# Patient Record
Sex: FEMALE | Race: WHITE | NOT HISPANIC OR LATINO | URBAN - METROPOLITAN AREA
[De-identification: names, ages, dates, MRNs, and addresses within clinical notes are randomized per-mention and may not be internally consistent; named-entity substitution may affect disease eponyms.]

---

## 2021-04-20 ENCOUNTER — OFFICE VISIT (OUTPATIENT)
Dept: URBAN - METROPOLITAN AREA CLINIC 80 | Facility: CLINIC | Age: 51
End: 2021-04-20
Payer: COMMERCIAL

## 2021-04-20 DIAGNOSIS — Z83.71 FAMILY HISTORY OF COLONIC POLYPS: ICD-10-CM

## 2021-04-20 DIAGNOSIS — R19.4 CHANGE IN BOWEL HABIT: ICD-10-CM

## 2021-04-20 DIAGNOSIS — R68.89 THROAT CLEARING: ICD-10-CM

## 2021-04-20 DIAGNOSIS — K21.9 LPRD (LARYNGOPHARYNGEAL REFLUX DISEASE): ICD-10-CM

## 2021-04-20 PROCEDURE — 99245 OFF/OP CONSLTJ NEW/EST HI 55: CPT | Performed by: INTERNAL MEDICINE

## 2021-04-20 PROCEDURE — 99244 OFF/OP CNSLTJ NEW/EST MOD 40: CPT | Performed by: INTERNAL MEDICINE

## 2021-04-20 RX ORDER — FAMOTIDINE 10 MG/1
1 TABLET AS NEEDED TABLET, FILM COATED ORAL TWICE A DAY
Status: ACTIVE | COMMUNITY

## 2021-04-20 RX ORDER — POLYETHYLENE GLYCOL 3350, SODIUM SULFATE, SODIUM CHLORIDE, POTASSIUM CHLORIDE, ASCORBIC ACID, SODIUM ASCORBATE 140-9-5.2G
AS DIRECTED KIT ORAL 1
Qty: 1 | Refills: 0 | OUTPATIENT
Start: 2021-04-20 | End: 2021-04-21

## 2021-04-20 RX ORDER — FAMOTIDINE 40 MG/1
1 TABLET AT BEDTIME TABLET, FILM COATED ORAL BID
Qty: 180 TABLET | Refills: 3 | OUTPATIENT
Start: 2021-04-20

## 2021-04-20 RX ORDER — ISOTRETINOIN 40 MG/1
AS DIRECTED CAPSULE, GELATIN COATED ORAL
Status: ACTIVE | COMMUNITY

## 2021-04-20 RX ORDER — MULTIVITAMIN
1 TABLET TABLET ORAL ONCE A DAY
Status: ACTIVE | COMMUNITY

## 2021-04-20 RX ORDER — ACETAMINOPHEN 500 MG
AS DIRECTED TABLET ORAL
Status: ACTIVE | COMMUNITY

## 2021-04-20 RX ORDER — DROSPIRENONE AND ETHINYL ESTRADIOL 0.03MG-3MG
TAKE 1 TABLET BY ORAL ROUTE ONCE DAILY KIT ORAL 1
Qty: 0 | Refills: 0 | Status: DISCONTINUED | COMMUNITY
Start: 1900-01-01

## 2021-04-20 NOTE — HPI-TODAY'S VISIT:
The patient was referred by Dr. Milly Pizarro for LPR .   A copy of this document is being forwarded to the referring provider. In past Zantac helped a ton - Has tried Pepcid - is better than it was but not fully - in past could be all day - now mostly when she eats  Takes Pepcid 20mg before bed time Nausea at times, no emesis No abd pain No fevers or chills At times coughing occurs   Has never felt heartburn or indigestion Has never had egd or colon Normal bowel habit was 1 BM every am - started to change more 9 months ago - now can go a few days between bm - gets uncomfortable - takes a probiotic nightly - no BRBPR or melena Father had colon polyps She has tried some OTC 14 day PPIS, helped for a few days and then would stop NO NSAID use

## 2021-05-19 ENCOUNTER — CLAIMS CREATED FROM THE CLAIM WINDOW (OUTPATIENT)
Dept: URBAN - METROPOLITAN AREA CLINIC 4 | Facility: CLINIC | Age: 51
End: 2021-05-19
Payer: COMMERCIAL

## 2021-05-19 ENCOUNTER — OFFICE VISIT (OUTPATIENT)
Dept: URBAN - METROPOLITAN AREA SURGERY CENTER 19 | Facility: SURGERY CENTER | Age: 51
End: 2021-05-19
Payer: COMMERCIAL

## 2021-05-19 DIAGNOSIS — Z86.010 H/O ADENOMATOUS POLYP OF COLON: ICD-10-CM

## 2021-05-19 DIAGNOSIS — K21.9 GASTRO-ESOPHAGEAL REFLUX DISEASE WITHOUT ESOPHAGITIS: ICD-10-CM

## 2021-05-19 DIAGNOSIS — K31.89 GASTRIC PERFORATION WITHOUT ULCER: ICD-10-CM

## 2021-05-19 DIAGNOSIS — K31.89 ACQUIRED DEFORMITY OF DUODENUM: ICD-10-CM

## 2021-05-19 DIAGNOSIS — K21.9 ACID REFLUX: ICD-10-CM

## 2021-05-19 DIAGNOSIS — R09.89 ABDOMINAL BRUIT: ICD-10-CM

## 2021-05-19 PROCEDURE — 88305 TISSUE EXAM BY PATHOLOGIST: CPT | Performed by: PATHOLOGY

## 2021-05-19 PROCEDURE — G0105 COLORECTAL SCRN; HI RISK IND: HCPCS | Performed by: INTERNAL MEDICINE

## 2021-05-19 PROCEDURE — 43239 EGD BIOPSY SINGLE/MULTIPLE: CPT | Performed by: INTERNAL MEDICINE

## 2021-05-19 PROCEDURE — 88312 SPECIAL STAINS GROUP 1: CPT | Performed by: PATHOLOGY

## 2021-05-19 PROCEDURE — G8907 PT DOC NO EVENTS ON DISCHARG: HCPCS | Performed by: INTERNAL MEDICINE

## 2021-05-19 RX ORDER — FAMOTIDINE 10 MG/1
1 TABLET AS NEEDED TABLET, FILM COATED ORAL TWICE A DAY
Status: ACTIVE | COMMUNITY

## 2021-05-19 RX ORDER — MULTIVITAMIN
1 TABLET TABLET ORAL ONCE A DAY
Status: ACTIVE | COMMUNITY

## 2021-05-19 RX ORDER — ACETAMINOPHEN 500 MG
AS DIRECTED TABLET ORAL
Status: ACTIVE | COMMUNITY

## 2021-05-19 RX ORDER — ISOTRETINOIN 40 MG/1
AS DIRECTED CAPSULE, GELATIN COATED ORAL
Status: ACTIVE | COMMUNITY

## 2021-05-19 RX ORDER — FAMOTIDINE 40 MG/1
1 TABLET AT BEDTIME TABLET, FILM COATED ORAL BID
Qty: 180 TABLET | Refills: 3 | Status: ACTIVE | COMMUNITY
Start: 2021-04-20

## 2021-05-25 ENCOUNTER — TELEPHONE ENCOUNTER (OUTPATIENT)
Dept: URBAN - METROPOLITAN AREA CLINIC 79 | Facility: CLINIC | Age: 51
End: 2021-05-25

## 2021-05-25 RX ORDER — FAMOTIDINE 40 MG/1
1 TABLET AT BEDTIME TABLET, FILM COATED ORAL BID
Qty: 180 TABLET | Refills: 3
Start: 2021-04-20

## 2021-05-27 ENCOUNTER — TELEPHONE ENCOUNTER (OUTPATIENT)
Dept: URBAN - METROPOLITAN AREA CLINIC 79 | Facility: CLINIC | Age: 51
End: 2021-05-27

## 2021-05-27 RX ORDER — PANTOPRAZOLE SODIUM 40 MG/1
1 TABLET TABLET, DELAYED RELEASE ORAL ONCE A DAY
Qty: 90 TABLET | Refills: 3 | OUTPATIENT
Start: 2021-05-27

## 2021-06-07 ENCOUNTER — WEB ENCOUNTER (OUTPATIENT)
Dept: URBAN - METROPOLITAN AREA CLINIC 80 | Facility: CLINIC | Age: 51
End: 2021-06-07

## 2021-06-14 ENCOUNTER — TELEPHONE ENCOUNTER (OUTPATIENT)
Dept: URBAN - METROPOLITAN AREA CLINIC 79 | Facility: CLINIC | Age: 51
End: 2021-06-14

## 2021-06-14 RX ORDER — FAMOTIDINE 40 MG/1
1 TABLET AT BEDTIME TABLET, FILM COATED ORAL BID
Qty: 180 TABLET | Refills: 3
Start: 2021-04-20

## 2021-07-07 ENCOUNTER — WEB ENCOUNTER (OUTPATIENT)
Dept: URBAN - METROPOLITAN AREA CLINIC 80 | Facility: CLINIC | Age: 51
End: 2021-07-07

## 2021-07-07 RX ORDER — DEXLANSOPRAZOLE 60 MG/1
1 CAPSULE CAPSULE, DELAYED RELEASE ORAL ONCE A DAY
Qty: 30 | Refills: 1 | OUTPATIENT
Start: 2021-07-09

## 2021-08-17 ENCOUNTER — OFFICE VISIT (OUTPATIENT)
Dept: URBAN - METROPOLITAN AREA CLINIC 80 | Facility: CLINIC | Age: 51
End: 2021-08-17
Payer: COMMERCIAL

## 2021-08-17 ENCOUNTER — LAB OUTSIDE AN ENCOUNTER (OUTPATIENT)
Dept: URBAN - METROPOLITAN AREA CLINIC 80 | Facility: CLINIC | Age: 51
End: 2021-08-17

## 2021-08-17 DIAGNOSIS — K21.9 LPRD (LARYNGOPHARYNGEAL REFLUX DISEASE): ICD-10-CM

## 2021-08-17 PROCEDURE — 99214 OFFICE O/P EST MOD 30 MIN: CPT | Performed by: INTERNAL MEDICINE

## 2021-08-17 RX ORDER — MULTIVITAMIN
1 TABLET TABLET ORAL ONCE A DAY
Status: DISCONTINUED | COMMUNITY

## 2021-08-17 RX ORDER — PANTOPRAZOLE SODIUM 40 MG/1
1 TABLET TABLET, DELAYED RELEASE ORAL ONCE A DAY
Qty: 90 TABLET | Refills: 3 | Status: DISCONTINUED | COMMUNITY
Start: 2021-05-27

## 2021-08-17 RX ORDER — FAMOTIDINE 40 MG/1
1 TABLET AT BEDTIME TABLET, FILM COATED ORAL BID
Qty: 180 TABLET | Refills: 3 | Status: DISCONTINUED | COMMUNITY
Start: 2021-04-20

## 2021-08-17 RX ORDER — FAMOTIDINE 10 MG/1
1 TABLET AS NEEDED TABLET, FILM COATED ORAL TWICE A DAY
Status: DISCONTINUED | COMMUNITY

## 2021-08-17 RX ORDER — ISOTRETINOIN 40 MG/1
AS DIRECTED CAPSULE, GELATIN COATED ORAL
Status: DISCONTINUED | COMMUNITY

## 2021-08-17 RX ORDER — DEXLANSOPRAZOLE 60 MG/1
1 CAPSULE CAPSULE, DELAYED RELEASE ORAL ONCE A DAY
Qty: 30 | Refills: 1 | Status: ACTIVE | COMMUNITY
Start: 2021-07-09

## 2021-08-17 RX ORDER — ACETAMINOPHEN 500 MG
AS DIRECTED TABLET ORAL
Status: DISCONTINUED | COMMUNITY

## 2021-08-17 NOTE — HPI-TODAY'S VISIT:
Patient had egd and colon 5/2021 EGD showed gastritis and reflux type changes Colon overall wnl other than internal hemorrhoids She feels the Dexilant is working better than other meds (Pantoprazole bid and Pepcid) Some days still symptoms constantly clearing her throat after certain meals but cannot figure out which meal - worse before meals when drinking water or if she eats a snack before dinner No nausea or emesis No dysphagia ENT in past could not find a cause other than seeing acid irritation in her throat Not feeling any heartburn or indigestion She takes Xyzal and Azelastine bid - if she misses Azelastine than she notices a difference in her congestion at times

## 2021-08-20 ENCOUNTER — WEB ENCOUNTER (OUTPATIENT)
Dept: URBAN - METROPOLITAN AREA CLINIC 80 | Facility: CLINIC | Age: 51
End: 2021-08-20

## 2021-08-20 RX ORDER — DEXLANSOPRAZOLE 60 MG/1
1 CAPSULE CAPSULE, DELAYED RELEASE ORAL ONCE A DAY
Qty: 90 CAPSULE | Refills: 3
Start: 2021-07-09

## 2021-08-31 ENCOUNTER — WEB ENCOUNTER (OUTPATIENT)
Dept: URBAN - METROPOLITAN AREA CLINIC 80 | Facility: CLINIC | Age: 51
End: 2021-08-31

## 2021-08-31 RX ORDER — DEXLANSOPRAZOLE 60 MG/1
1 CAPSULE CAPSULE, DELAYED RELEASE ORAL ONCE A DAY
Qty: 90 | Refills: 0
Start: 2021-07-09

## 2021-10-05 ENCOUNTER — OFFICE VISIT (OUTPATIENT)
Dept: URBAN - METROPOLITAN AREA SURGERY CENTER 19 | Facility: SURGERY CENTER | Age: 51
End: 2021-10-05
Payer: COMMERCIAL

## 2021-10-05 DIAGNOSIS — R05.3 CHRONIC COUGH: ICD-10-CM

## 2021-10-05 DIAGNOSIS — K21.9 ACID REFLUX: ICD-10-CM

## 2021-10-05 PROCEDURE — 43239 EGD BIOPSY SINGLE/MULTIPLE: CPT | Performed by: INTERNAL MEDICINE

## 2021-10-05 PROCEDURE — G8907 PT DOC NO EVENTS ON DISCHARG: HCPCS | Performed by: INTERNAL MEDICINE

## 2021-10-07 ENCOUNTER — TELEPHONE ENCOUNTER (OUTPATIENT)
Dept: URBAN - METROPOLITAN AREA CLINIC 80 | Facility: CLINIC | Age: 51
End: 2021-10-07

## 2021-10-07 ENCOUNTER — OFFICE VISIT (OUTPATIENT)
Dept: URBAN - METROPOLITAN AREA CLINIC 80 | Facility: CLINIC | Age: 51
End: 2021-10-07

## 2021-10-07 RX ORDER — DEXLANSOPRAZOLE 60 MG/1
1 CAPSULE CAPSULE, DELAYED RELEASE ORAL ONCE A DAY
Qty: 90 | Refills: 0 | Status: ACTIVE | COMMUNITY
Start: 2021-07-09

## 2021-11-08 ENCOUNTER — TELEPHONE ENCOUNTER (OUTPATIENT)
Dept: URBAN - METROPOLITAN AREA CLINIC 92 | Facility: CLINIC | Age: 51
End: 2021-11-08

## 2021-11-08 ENCOUNTER — OFFICE VISIT (OUTPATIENT)
Dept: URBAN - METROPOLITAN AREA TELEHEALTH 2 | Facility: TELEHEALTH | Age: 51
End: 2021-11-08
Payer: COMMERCIAL

## 2021-11-08 DIAGNOSIS — Z12.11 COLON CANCER SCREENING: ICD-10-CM

## 2021-11-08 DIAGNOSIS — K21.9 LPRD (LARYNGOPHARYNGEAL REFLUX DISEASE): ICD-10-CM

## 2021-11-08 PROCEDURE — 99213 OFFICE O/P EST LOW 20 MIN: CPT | Performed by: INTERNAL MEDICINE

## 2021-11-08 RX ORDER — DEXLANSOPRAZOLE 60 MG/1
1 CAPSULE CAPSULE, DELAYED RELEASE ORAL ONCE A DAY
Qty: 90 | Refills: 0 | Status: ACTIVE | COMMUNITY
Start: 2021-07-09

## 2021-11-08 RX ORDER — DESIPRAMINE HYDROCHLORIDE 10 MG/1
1 TABLET TABLET, FILM COATED ORAL
Qty: 90 | Refills: 3 | OUTPATIENT
Start: 2021-11-08

## 2021-11-08 NOTE — HPI-TODAY'S VISIT:
Patient seen today via telehealth recently seen by shakira menard for LPR symptoms she had egd and colon 5/2021 EGD showed gastritis and reflux type changes Colon overall wnl other than internal hemorrhoids tried pantoprazole, pepcid and recently dexilant with minimal benefit seen by ENT and told this is reflux she had an egd with bravo in 10/12/2021 with no signs of active gerd  seen today via telehealth with ongoing symptoms dexilant puts  small "dent" in her symptoms ongoinig daily issues with throat clearing triggered by certain foods but cannot figure out which meal no farzaneh n/v/dysphagia no abd pain normal appetite stable weight normal stools  no other complaints

## 2021-11-18 ENCOUNTER — OFFICE VISIT (OUTPATIENT)
Dept: URBAN - METROPOLITAN AREA CLINIC 80 | Facility: CLINIC | Age: 51
End: 2021-11-18

## 2021-12-22 ENCOUNTER — ERX REFILL RESPONSE (OUTPATIENT)
Dept: URBAN - METROPOLITAN AREA CLINIC 80 | Facility: CLINIC | Age: 51
End: 2021-12-22

## 2021-12-22 RX ORDER — DEXLANSOPRAZOLE 60 MG/1
1 CAPSULE CAPSULE, DELAYED RELEASE ORAL ONCE A DAY
Qty: 90 | Refills: 0 | OUTPATIENT

## 2021-12-22 RX ORDER — DEXLANSOPRAZOLE 60 MG/1
1 CAPSULE CAPSULE, DELAYED RELEASE ORAL ONCE A DAY
Qty: 90 | Refills: 1 | OUTPATIENT

## 2022-01-07 ENCOUNTER — OFFICE VISIT (OUTPATIENT)
Dept: URBAN - METROPOLITAN AREA TELEHEALTH 2 | Facility: TELEHEALTH | Age: 52
End: 2022-01-07

## 2022-01-21 ENCOUNTER — OFFICE VISIT (OUTPATIENT)
Dept: URBAN - METROPOLITAN AREA TELEHEALTH 2 | Facility: TELEHEALTH | Age: 52
End: 2022-01-21

## 2022-02-25 ENCOUNTER — OFFICE VISIT (OUTPATIENT)
Dept: URBAN - METROPOLITAN AREA TELEHEALTH 2 | Facility: TELEHEALTH | Age: 52
End: 2022-02-25
Payer: COMMERCIAL

## 2022-02-25 DIAGNOSIS — Z12.11 COLON CANCER SCREENING: ICD-10-CM

## 2022-02-25 DIAGNOSIS — K21.9 LPRD (LARYNGOPHARYNGEAL REFLUX DISEASE): ICD-10-CM

## 2022-02-25 PROBLEM — 414581006: Status: ACTIVE | Noted: 2021-04-20

## 2022-02-25 PROCEDURE — 99213 OFFICE O/P EST LOW 20 MIN: CPT | Performed by: PHYSICIAN ASSISTANT

## 2022-02-25 RX ORDER — ZOLPIDEM TARTRATE 10 MG/1
1 TABLET AT BEDTIME AS NEEDED TABLET, FILM COATED ORAL ONCE A DAY
Status: ACTIVE | COMMUNITY

## 2022-02-25 RX ORDER — DEXLANSOPRAZOLE 60 MG/1
1 CAPSULE CAPSULE, DELAYED RELEASE ORAL ONCE A DAY
Qty: 90 CAPSULE | Refills: 3 | OUTPATIENT
Start: 2022-02-25

## 2022-02-25 RX ORDER — DEXLANSOPRAZOLE 60 MG/1
1 CAPSULE CAPSULE, DELAYED RELEASE ORAL ONCE A DAY
Qty: 90 | Refills: 1 | Status: ACTIVE | COMMUNITY

## 2022-02-25 RX ORDER — DESIPRAMINE HYDROCHLORIDE 10 MG/1
1 TABLET TABLET, FILM COATED ORAL
Qty: 90 | Refills: 3 | Status: DISCONTINUED | COMMUNITY
Start: 2021-11-08

## 2022-02-25 RX ORDER — ROSUVASTATIN CALCIUM 40 MG/1
1 TABLET TABLET, FILM COATED ORAL ONCE A DAY
Status: ACTIVE | COMMUNITY

## 2022-02-25 NOTE — HPI-TODAY'S VISIT:
She is feeling better She found out recently she is post menapausal - on hormones so helping her a lot - able to come off her ambien On Dexilant but has to get off it because her insurance will not cover it after march she gets the choking feeling - has to clear her throat a lot - it is better than it was - knows to avoid spicy foods, cheese, tried going dairy free but too hard for her Dexilant to her has been life changing Bm are wnl Last egd and colon 5/21

## 2022-02-28 ENCOUNTER — WEB ENCOUNTER (OUTPATIENT)
Dept: URBAN - METROPOLITAN AREA CLINIC 80 | Facility: CLINIC | Age: 52
End: 2022-02-28

## 2022-02-28 RX ORDER — RABEPRAZOLE SODIUM 20 MG/1
1 TABLET TABLET, DELAYED RELEASE ORAL ONCE A DAY
Qty: 90 TABLET | Refills: 3 | OUTPATIENT
Start: 2022-03-02

## 2022-04-25 ENCOUNTER — TELEPHONE ENCOUNTER (OUTPATIENT)
Dept: URBAN - METROPOLITAN AREA CLINIC 92 | Facility: CLINIC | Age: 52
End: 2022-04-25

## 2022-04-25 ENCOUNTER — TELEPHONE ENCOUNTER (OUTPATIENT)
Dept: URBAN - METROPOLITAN AREA CLINIC 79 | Facility: CLINIC | Age: 52
End: 2022-04-25

## 2022-04-25 RX ORDER — RABEPRAZOLE SODIUM 20 MG/1
1 TABLET TABLET, DELAYED RELEASE ORAL ONCE A DAY
Qty: 90 | Refills: 3
Start: 2022-03-02

## 2023-04-09 ENCOUNTER — ERX REFILL RESPONSE (OUTPATIENT)
Dept: URBAN - METROPOLITAN AREA CLINIC 80 | Facility: CLINIC | Age: 53
End: 2023-04-09

## 2023-04-09 RX ORDER — RABEPRAZOLE SODIUM 20 MG/1
TAKE 1 TABLET ONCE DAILY TABLET, DELAYED RELEASE ORAL
Qty: 90 TABLET | Refills: 0 | OUTPATIENT

## 2023-04-09 RX ORDER — RABEPRAZOLE SODIUM 20 MG/1
1 TABLET TABLET, DELAYED RELEASE ORAL ONCE A DAY
Qty: 90 | Refills: 3 | OUTPATIENT

## 2023-07-06 ENCOUNTER — ERX REFILL RESPONSE (OUTPATIENT)
Dept: URBAN - METROPOLITAN AREA CLINIC 80 | Facility: CLINIC | Age: 53
End: 2023-07-06

## 2023-07-06 RX ORDER — RABEPRAZOLE SODIUM 20 MG/1
TAKE 1 TABLET ONCE DAILY TABLET, DELAYED RELEASE ORAL
Qty: 90 TABLET | Refills: 0 | OUTPATIENT

## 2023-07-06 RX ORDER — RABEPRAZOLE SODIUM 20 MG/1
TAKE 1 TABLET DAILY (NEEDS OFFICE VISIT FOR REFILLS) TABLET, DELAYED RELEASE ORAL
Qty: 90 TABLET | Refills: 0 | OUTPATIENT

## 2023-12-06 ENCOUNTER — OFFICE VISIT (OUTPATIENT)
Dept: URBAN - METROPOLITAN AREA CLINIC 80 | Facility: CLINIC | Age: 53
End: 2023-12-06
Payer: COMMERCIAL

## 2023-12-06 ENCOUNTER — DASHBOARD ENCOUNTERS (OUTPATIENT)
Age: 53
End: 2023-12-06

## 2023-12-06 VITALS
TEMPERATURE: 97.3 F | DIASTOLIC BLOOD PRESSURE: 63 MMHG | BODY MASS INDEX: 22.23 KG/M2 | SYSTOLIC BLOOD PRESSURE: 95 MMHG | HEART RATE: 89 BPM | HEIGHT: 62 IN | WEIGHT: 120.8 LBS

## 2023-12-06 DIAGNOSIS — Z12.11 COLON CANCER SCREENING: ICD-10-CM

## 2023-12-06 DIAGNOSIS — K21.9 LPRD (LARYNGOPHARYNGEAL REFLUX DISEASE): ICD-10-CM

## 2023-12-06 PROCEDURE — 99213 OFFICE O/P EST LOW 20 MIN: CPT | Performed by: PHYSICIAN ASSISTANT

## 2023-12-06 RX ORDER — PROGESTERONE 100 MG/1
1 CAPSULE AT BEDTIME CAPSULE ORAL ONCE A DAY
Status: ACTIVE | COMMUNITY

## 2023-12-06 RX ORDER — RABEPRAZOLE SODIUM 20 MG/1
TAKE 1 TABLET DAILY (NEEDS OFFICE VISIT FOR REFILLS) TABLET, DELAYED RELEASE ORAL
Qty: 90 TABLET | Refills: 0 | Status: ACTIVE | COMMUNITY

## 2023-12-06 RX ORDER — RABEPRAZOLE SODIUM 20 MG/1
1 TABLET TABLET, DELAYED RELEASE ORAL ONCE A DAY
Qty: 90 TABLET | Refills: 3 | OUTPATIENT
Start: 2023-12-06

## 2023-12-06 RX ORDER — ROSUVASTATIN CALCIUM 40 MG/1
1 TABLET TABLET, FILM COATED ORAL ONCE A DAY
Status: ACTIVE | COMMUNITY

## 2023-12-06 RX ORDER — DEXLANSOPRAZOLE 60 MG/1
1 CAPSULE CAPSULE, DELAYED RELEASE ORAL ONCE A DAY
Qty: 90 | Refills: 1 | Status: DISCONTINUED | COMMUNITY

## 2023-12-06 RX ORDER — DEXLANSOPRAZOLE 60 MG/1
1 CAPSULE CAPSULE, DELAYED RELEASE ORAL ONCE A DAY
Qty: 90 CAPSULE | Refills: 3 | Status: DISCONTINUED | COMMUNITY
Start: 2022-02-25

## 2023-12-06 RX ORDER — ZOLPIDEM TARTRATE 10 MG/1
1 TABLET AT BEDTIME AS NEEDED TABLET, FILM COATED ORAL ONCE A DAY
Status: DISCONTINUED | COMMUNITY

## 2023-12-06 NOTE — HPI-TODAY'S VISIT:
Pt has been off the Rabeprazole for 2 weeks and feels horrible - was hoping she would be ok off it but her reflux comes back pretty bad no abd pain no nausea or emesis no fevers or chills BM are wnl no BRBPR or melena no dysphagia

## 2024-08-06 ENCOUNTER — APPOINTMENT (RX ONLY)
Dept: URBAN - METROPOLITAN AREA CLINIC 159 | Facility: CLINIC | Age: 54
Setting detail: DERMATOLOGY
End: 2024-08-06

## 2024-08-06 DIAGNOSIS — D18.0 HEMANGIOMA: ICD-10-CM

## 2024-08-06 DIAGNOSIS — L81.4 OTHER MELANIN HYPERPIGMENTATION: ICD-10-CM

## 2024-08-06 DIAGNOSIS — L82.1 OTHER SEBORRHEIC KERATOSIS: ICD-10-CM

## 2024-08-06 DIAGNOSIS — D22 MELANOCYTIC NEVI: ICD-10-CM

## 2024-08-06 DIAGNOSIS — L72.0 EPIDERMAL CYST: ICD-10-CM

## 2024-08-06 PROBLEM — D22.5 MELANOCYTIC NEVI OF TRUNK: Status: ACTIVE | Noted: 2024-08-06

## 2024-08-06 PROBLEM — D18.01 HEMANGIOMA OF SKIN AND SUBCUTANEOUS TISSUE: Status: ACTIVE | Noted: 2024-08-06

## 2024-08-06 PROCEDURE — ? COUNSELING

## 2024-08-06 PROCEDURE — 99213 OFFICE O/P EST LOW 20 MIN: CPT

## 2024-08-06 ASSESSMENT — LOCATION DETAILED DESCRIPTION DERM
LOCATION DETAILED: INFERIOR THORACIC SPINE
LOCATION DETAILED: SUPERIOR THORACIC SPINE
LOCATION DETAILED: INFERIOR MID FOREHEAD
LOCATION DETAILED: LEFT PROXIMAL CALF
LOCATION DETAILED: LEFT INFERIOR LATERAL FOREHEAD
LOCATION DETAILED: RIGHT LATERAL SUPERIOR EYELID
LOCATION DETAILED: LEFT LATERAL FOREHEAD

## 2024-08-06 ASSESSMENT — LOCATION ZONE DERM
LOCATION ZONE: TRUNK
LOCATION ZONE: LEG
LOCATION ZONE: EYELID
LOCATION ZONE: FACE

## 2024-08-06 ASSESSMENT — LOCATION SIMPLE DESCRIPTION DERM
LOCATION SIMPLE: LEFT FOREHEAD
LOCATION SIMPLE: RIGHT SUPERIOR EYELID
LOCATION SIMPLE: UPPER BACK
LOCATION SIMPLE: LEFT CALF
LOCATION SIMPLE: INFERIOR FOREHEAD

## 2025-08-15 ENCOUNTER — APPOINTMENT (OUTPATIENT)
Dept: URBAN - METROPOLITAN AREA CLINIC 159 | Facility: CLINIC | Age: 55
Setting detail: DERMATOLOGY
End: 2025-08-15

## 2025-08-15 DIAGNOSIS — Z71.89 OTHER SPECIFIED COUNSELING: ICD-10-CM

## 2025-08-15 DIAGNOSIS — D22 MELANOCYTIC NEVI: ICD-10-CM

## 2025-08-15 DIAGNOSIS — D18.0 HEMANGIOMA: ICD-10-CM

## 2025-08-15 DIAGNOSIS — L81.4 OTHER MELANIN HYPERPIGMENTATION: ICD-10-CM

## 2025-08-15 DIAGNOSIS — Z12.83 ENCOUNTER FOR SCREENING FOR MALIGNANT NEOPLASM OF SKIN: ICD-10-CM

## 2025-08-15 DIAGNOSIS — L82.1 OTHER SEBORRHEIC KERATOSIS: ICD-10-CM

## 2025-08-15 PROBLEM — D22.5 MELANOCYTIC NEVI OF TRUNK: Status: ACTIVE | Noted: 2025-08-15

## 2025-08-15 PROBLEM — D18.01 HEMANGIOMA OF SKIN AND SUBCUTANEOUS TISSUE: Status: ACTIVE | Noted: 2025-08-15

## 2025-08-15 PROCEDURE — ? COUNSELING

## 2025-08-15 PROCEDURE — ?

## 2025-08-15 ASSESSMENT — LOCATION ZONE DERM
LOCATION ZONE: TRUNK
LOCATION ZONE: FACE

## 2025-08-15 ASSESSMENT — LOCATION DETAILED DESCRIPTION DERM
LOCATION DETAILED: LEFT RIB CAGE
LOCATION DETAILED: INFERIOR THORACIC SPINE
LOCATION DETAILED: INFERIOR MID FOREHEAD
LOCATION DETAILED: SUPERIOR THORACIC SPINE

## 2025-08-15 ASSESSMENT — LOCATION SIMPLE DESCRIPTION DERM
LOCATION SIMPLE: UPPER BACK
LOCATION SIMPLE: INFERIOR FOREHEAD
LOCATION SIMPLE: ABDOMEN